# Patient Record
Sex: FEMALE | Race: WHITE | HISPANIC OR LATINO
[De-identification: names, ages, dates, MRNs, and addresses within clinical notes are randomized per-mention and may not be internally consistent; named-entity substitution may affect disease eponyms.]

---

## 2019-06-10 ENCOUNTER — TRANSCRIPTION ENCOUNTER (OUTPATIENT)
Age: 59
End: 2019-06-10

## 2021-03-12 ENCOUNTER — TRANSCRIPTION ENCOUNTER (OUTPATIENT)
Age: 61
End: 2021-03-12

## 2021-05-10 ENCOUNTER — TRANSCRIPTION ENCOUNTER (OUTPATIENT)
Age: 61
End: 2021-05-10

## 2022-07-26 ENCOUNTER — APPOINTMENT (OUTPATIENT)
Dept: PAIN MANAGEMENT | Facility: CLINIC | Age: 62
End: 2022-07-26

## 2022-07-26 PROBLEM — Z00.00 ENCOUNTER FOR PREVENTIVE HEALTH EXAMINATION: Status: ACTIVE | Noted: 2022-07-26

## 2022-08-18 ENCOUNTER — APPOINTMENT (OUTPATIENT)
Dept: PAIN MANAGEMENT | Facility: CLINIC | Age: 62
End: 2022-08-18

## 2022-08-18 VITALS
WEIGHT: 150 LBS | HEIGHT: 64 IN | BODY MASS INDEX: 25.61 KG/M2 | HEART RATE: 70 BPM | SYSTOLIC BLOOD PRESSURE: 145 MMHG | DIASTOLIC BLOOD PRESSURE: 98 MMHG

## 2022-08-18 PROCEDURE — 99213 OFFICE O/P EST LOW 20 MIN: CPT

## 2022-08-18 NOTE — HISTORY OF PRESENT ILLNESS
[FreeTextEntry1] : ORIGINAL PRESENTATION: She continues to have numbness and pain in bilateral hands and wrists, secondary to CRP and cervical spine radiculopathy. She reports no changes in terms of her pain this month. She feels that instead of her hands becoming stiff they are swelling and turning a dark bluish color and she has noted this for several years now. She continues manages medically with gabapentin 100mg BID and 300mg nightly most days with adequate relief, using muscle relaxants sparingly. Once again, she defers injections at this time and has moderate relief.\par \par TODAY: The reason for the visit is ongoing chronic pain noted involving the neck and bilateral arms/hands. Today she rates her pain a 8/10. Overall she denies any changes with regards to her pain. She continues to have intermittent swelling. She manages her chronic pain with the use of gabapentin 100mg BID with 300mg at night and Skelaxin. The regimen allows for pain relief and improvements in activity and range of motion. She wishes to continue with the given regimen at this time. The skelxain make her tired so she takes it sparingly. She requests refill.

## 2022-08-18 NOTE — DISCUSSION/SUMMARY
[de-identified] : PLAN: 62yo F who suffers from chronic pain involving the upper extremities due to RSD as well as fibromyalgia with a component of cervical radiculitis. \par \par The use of medication is to continue as mentioned, I will revisit with her in 6 months for continued care.\par \par Valeria Bunn, MS PA-C\par Sesar Bradley,   \par Diplomate, American Board of Anesthesiology  \par Diplomate, American Board of Pain Medicine\par

## 2022-08-18 NOTE — PHYSICAL EXAM
[de-identified] : Constitutional:  The patient appears well developed, well nourished.  Examination of patients ability to communicate functionally was normal. \par \par Skin:  Skin of the head and face is normal without rashes, lesions or ulcers. \par \par Cardiovascular: Peripheral Vascular System is normal. \par \par Lymphatic: Normal Palpation of lymph nodes in the axilla, inguinal and cervical.

## 2022-09-18 ENCOUNTER — NON-APPOINTMENT (OUTPATIENT)
Age: 62
End: 2022-09-18

## 2023-02-22 ENCOUNTER — APPOINTMENT (OUTPATIENT)
Dept: PAIN MANAGEMENT | Facility: CLINIC | Age: 63
End: 2023-02-22

## 2023-02-22 ENCOUNTER — APPOINTMENT (OUTPATIENT)
Dept: PAIN MANAGEMENT | Facility: CLINIC | Age: 63
End: 2023-02-22
Payer: COMMERCIAL

## 2023-02-22 VITALS — BODY MASS INDEX: 25.61 KG/M2 | HEIGHT: 64 IN | WEIGHT: 150 LBS

## 2023-02-22 PROCEDURE — 99213 OFFICE O/P EST LOW 20 MIN: CPT

## 2023-02-22 NOTE — PHYSICAL EXAM
[de-identified] : Constitutional:  The patient appears well developed, well nourished.  Examination of patients ability to communicate functionally was normal. \par \par Skin:  Skin of the head and face is normal without rashes, lesions or ulcers. \par \par Cardiovascular: Peripheral Vascular System is normal. \par \par Lymphatic: Normal Palpation of lymph nodes in the axilla, inguinal and cervical.

## 2023-02-22 NOTE — DISCUSSION/SUMMARY
[de-identified] : PLAN: 61yo F who suffers from chronic pain involving the neck and upper extremities. The use of medication is to continue as mentioned. Her Gabapentin was refilled today. I will revisit with her in 6 months for continued care.\par \par Elaine Lu PA-C\par Sesar Bradley DO\par \par \par

## 2023-02-22 NOTE — HISTORY OF PRESENT ILLNESS
[FreeTextEntry1] : ORIGINAL PRESENTATION: She is a 62-year-old female who continues to have numbness and pain in bilateral hands and wrists, secondary to CRP and cervical spine radiculopathy. She reports no changes in terms of her pain this month. She feels that instead of her hands becoming stiff they are swelling and turning a dark bluish color and she has noted this for several years now. She continues manages medically with gabapentin 100mg BID and 300mg nightly most days with adequate relief, using muscle relaxants sparingly. Once again, she defers injections at this time and has moderate relief.\par \par TODAY: She presents for a revisit appointment. The reason for the visit is ongoing chronic pain noted involving the neck and bilateral arms/hands. Today, she rates her pain a 7/10. Overall, she denies any changes with regards to her pain over the past 6 months. She continues to have numbness in her hands and intermittent swelling. She uses braces.\par \par She manages her chronic pain with the use of gabapentin 100mg in the AM and afternoon along with 300mg at night. She also takes Skelaxin very sparingly. The regimen allows for pain relief and improvements in activity and range of motion. She wishes to continue with the given regimen at this time.

## 2023-08-18 ENCOUNTER — APPOINTMENT (OUTPATIENT)
Dept: PAIN MANAGEMENT | Facility: CLINIC | Age: 63
End: 2023-08-18
Payer: COMMERCIAL

## 2023-08-18 VITALS
DIASTOLIC BLOOD PRESSURE: 86 MMHG | HEART RATE: 76 BPM | BODY MASS INDEX: 25.61 KG/M2 | SYSTOLIC BLOOD PRESSURE: 135 MMHG | WEIGHT: 150 LBS | HEIGHT: 64 IN

## 2023-08-18 PROCEDURE — 99213 OFFICE O/P EST LOW 20 MIN: CPT

## 2023-08-18 NOTE — PHYSICAL EXAM
[de-identified] : Constitutional:  The patient appears well developed, well nourished.  Examination of patients ability to communicate functionally was normal. \par  \par  Skin:  Skin of the head and face is normal without rashes, lesions or ulcers. \par  \par  Cardiovascular: Peripheral Vascular System is normal. \par  \par  Lymphatic: Normal Palpation of lymph nodes in the axilla, inguinal and cervical. 
Refer to the Assessment tab to view/cancel completed assessment.

## 2023-08-18 NOTE — DISCUSSION/SUMMARY
[de-identified] : 63yo F who suffers from chronic pain involving the neck and upper extremities. The use of medication is to continue as mentioned. Her Gabapentin and Skelaxin were refilled today. I will revisit with her in 6 months for continued care.  MICHAEL Sauceda, DO

## 2023-08-18 NOTE — HISTORY OF PRESENT ILLNESS
[FreeTextEntry1] : ORIGINAL PRESENTATION: She is a 62-year-old female who continues to have numbness and pain in bilateral hands and wrists, secondary to CRP and cervical spine radiculopathy. She reports no changes in terms of her pain this month. She feels that instead of her hands becoming stiff they are swelling and turning a dark bluish color and she has noted this for several years now. She continues manages medically with gabapentin 100mg BID and 300mg nightly most days with adequate relief, using muscle relaxants sparingly. Once again, she defers injections at this time and has moderate relief.  TODAY: She presents for a revisit appointment. The reason for the visit is ongoing chronic pain noted involving the neck and bilateral arms/hands. Her symptoms have remained stable over the past 6 months. Today, she rates her pain a 7.5/10. She continues to have numbness in her hands and intermittent swelling. She uses braces intermittently.  She manages her chronic pain with the use of gabapentin 100mg in the AM and afternoon along with 300mg at night. She also takes Skelaxin very sparingly. The regimen allows for pain relief and improvements in activity and range of motion. She wishes to continue with the given regimen at this time.

## 2024-01-24 ENCOUNTER — APPOINTMENT (OUTPATIENT)
Dept: PAIN MANAGEMENT | Facility: CLINIC | Age: 64
End: 2024-01-24
Payer: COMMERCIAL

## 2024-01-24 VITALS — HEIGHT: 64 IN | WEIGHT: 150 LBS | BODY MASS INDEX: 25.61 KG/M2

## 2024-01-24 PROCEDURE — ZZZZZ: CPT

## 2024-01-24 RX ORDER — AMLODIPINE BESYLATE 5 MG/1
5 TABLET ORAL
Refills: 0 | Status: ACTIVE | COMMUNITY

## 2024-01-24 RX ORDER — LOSARTAN POTASSIUM AND HYDROCHLOROTHIAZIDE 12.5; 1 MG/1; MG/1
100-12.5 TABLET ORAL
Refills: 0 | Status: ACTIVE | COMMUNITY

## 2024-01-24 RX ORDER — METAXALONE 800 MG/1
800 TABLET ORAL
Qty: 30 | Refills: 3 | Status: ACTIVE | COMMUNITY
Start: 2022-08-18 | End: 1900-01-01

## 2024-01-24 NOTE — PHYSICAL EXAM
[de-identified] : Constitutional:  The patient appears well developed, well nourished.  Examination of patients ability to communicate functionally was normal. \par  \par  Skin:  Skin of the head and face is normal without rashes, lesions or ulcers. \par  \par  Cardiovascular: Peripheral Vascular System is normal. \par  \par  Lymphatic: Normal Palpation of lymph nodes in the axilla, inguinal and cervical.

## 2024-01-24 NOTE — HISTORY OF PRESENT ILLNESS
[FreeTextEntry1] : ORIGINAL PRESENTATION: She is a 63-year-old female who continues to have numbness and pain in bilateral hands and wrists, secondary to CRP and cervical spine radiculopathy. She reports no changes in terms of her pain this month. She feels that instead of her hands becoming stiff they are swelling and turning a dark bluish color and she has noted this for several years now. She continues manages medically with gabapentin 100mg BID and 300mg nightly most days with adequate relief, using muscle relaxants sparingly. Once again, she defers injections at this time and has moderate relief.  PATIENT PRESENTS FOR FOLLOW UP: She is under our care for ongoing chronic pain noted involving the neck and bilateral arms/hands. She was last seen in the office back in August 2023. Patient usually follows up every 3 months and is here to have a yearly life insurance form filled out. She continues to have numbness in her hands and intermittent swelling. She uses braces intermittently and notes weakness in the arm and hands.  She manages her chronic pain with the use of gabapentin 100mg in the AM and afternoon along with 300mg at night. She also takes Skelaxin very sparingly. The regimen allows for pain relief and improvements in activity and range of motion. She wishes to continue with the given regimen at this time.

## 2024-01-24 NOTE — ASSESSMENT
[FreeTextEntry1] : This is a 63-year-old female who suffers from chronic pain involving the neck and upper extremities. The use of medication is to continue as mentioned. Her Gabapentin and Skelaxin were refilled today. She will follow up in 3 months for continued care. All this patients questions were answered and the conversation was understood well.  I, Rody Gaytan, attest that this documentation has been prepared under the direction and in the presence of Provider Delmy Franco MD.   Thank you for allowing me to assist in the management of this patient.    Best Regards,    Delmy Franco M.D., FAAPMR   Diplomate, American Board of Physical Medicine and Rehabilitation Diplomate, American Board of Pain Medicine  Diplomate, American Board of Pain Management

## 2024-02-21 ENCOUNTER — APPOINTMENT (OUTPATIENT)
Dept: PAIN MANAGEMENT | Facility: CLINIC | Age: 64
End: 2024-02-21

## 2024-04-24 ENCOUNTER — APPOINTMENT (OUTPATIENT)
Dept: PAIN MANAGEMENT | Facility: CLINIC | Age: 64
End: 2024-04-24
Payer: COMMERCIAL

## 2024-04-24 DIAGNOSIS — M54.12 RADICULOPATHY, CERVICAL REGION: ICD-10-CM

## 2024-04-24 DIAGNOSIS — M79.7 FIBROMYALGIA: ICD-10-CM

## 2024-04-24 DIAGNOSIS — M54.2 CERVICALGIA: ICD-10-CM

## 2024-04-24 DIAGNOSIS — G90.519 COMPLEX REGIONAL PAIN SYNDROME I OF UNSPECIFIED UPPER LIMB: ICD-10-CM

## 2024-04-24 PROCEDURE — 99214 OFFICE O/P EST MOD 30 MIN: CPT

## 2024-04-24 RX ORDER — GABAPENTIN 100 MG/1
100 CAPSULE ORAL
Qty: 60 | Refills: 3 | Status: ACTIVE | COMMUNITY
Start: 2022-08-18 | End: 1900-01-01

## 2024-04-24 RX ORDER — GABAPENTIN 300 MG/1
300 CAPSULE ORAL
Qty: 30 | Refills: 3 | Status: ACTIVE | COMMUNITY
Start: 2022-08-18 | End: 1900-01-01

## 2024-04-24 NOTE — ASSESSMENT
[FreeTextEntry1] : This is a 63-year-old female who suffers from chronic pain involving the neck and upper extremities. The use of medication is to continue as mentioned. Her Gabapentin was refilled today. She will follow up in 3 months for continued care. All this patient's questions were answered and the conversation was understood well.  MICHAEL Sauceda MD

## 2024-04-24 NOTE — HISTORY OF PRESENT ILLNESS
[FreeTextEntry1] : ORIGINAL PRESENTATION: She is a 63-year-old female who continues to have numbness and pain in bilateral hands and wrists, secondary to CRP and cervical spine radiculopathy. She reports no changes in terms of her pain this month. She feels that instead of her hands becoming stiff they are swelling and turning a dark bluish color and she has noted this for several years now. She continues manages medically with gabapentin 100mg BID and 300mg nightly most days with adequate relief, using muscle relaxants sparingly. Once again, she defers injections at this time and has moderate relief.  PATIENT PRESENTS FOR FOLLOW UP: Patient presents for a routine visit. She is under our care for ongoing chronic pain noted involving the neck and bilateral arms/hands. She continues to have numbness in her hands and intermittent swelling. She uses braces intermittently and notes weakness in the arm and hands. She manages her chronic pain with the use of gabapentin 100mg in the AM and afternoon along with 300mg at night. She also takes Skelaxin very sparingly. The regimen allows for pain relief and improvements in activity and range of motion. She wishes to continue with the given regimen at this time.

## 2024-04-24 NOTE — PHYSICAL EXAM
[de-identified] : Constitutional:  The patient appears well developed, well nourished.  Examination of patients ability to communicate functionally was normal. \par  \par  Skin:  Skin of the head and face is normal without rashes, lesions or ulcers. \par  \par  Cardiovascular: Peripheral Vascular System is normal. \par  \par  Lymphatic: Normal Palpation of lymph nodes in the axilla, inguinal and cervical.

## 2024-07-24 ENCOUNTER — APPOINTMENT (OUTPATIENT)
Dept: PAIN MANAGEMENT | Facility: CLINIC | Age: 64
End: 2024-07-24

## 2024-07-24 DIAGNOSIS — M79.7 FIBROMYALGIA: ICD-10-CM

## 2024-07-24 DIAGNOSIS — M54.12 RADICULOPATHY, CERVICAL REGION: ICD-10-CM

## 2024-07-24 DIAGNOSIS — M54.2 CERVICALGIA: ICD-10-CM

## 2024-07-24 DIAGNOSIS — G90.519 COMPLEX REGIONAL PAIN SYNDROME I OF UNSPECIFIED UPPER LIMB: ICD-10-CM

## 2024-07-24 PROCEDURE — 99214 OFFICE O/P EST MOD 30 MIN: CPT

## 2024-07-24 NOTE — HISTORY OF PRESENT ILLNESS
[FreeTextEntry1] : ORIGINAL PRESENTATION: She is a 63-year-old female who continues to have numbness and pain in bilateral hands and wrists, secondary to CRP and cervical spine radiculopathy. She reports no changes in terms of her pain this month. She feels that instead of her hands becoming stiff they are swelling and turning a dark bluish color and she has noted this for several years now. She continues manages medically with gabapentin 100mg BID and 300mg nightly most days with adequate relief, using muscle relaxants sparingly. Once again, she defers injections at this time and has moderate relief.  PATIENT PRESENTS FOR FOLLOW UP: Patient presents for a routine visit. She is under our care for ongoing chronic pain noted involving the neck and bilateral arms/hands. She continues to have numbness in her hands and intermittent swelling. She uses braces intermittently and notes weakness in the arm and hands. She has not wanted to undergo PT or injections. She manages her chronic pain with the use of gabapentin 100mg in the AM and afternoon along with 300mg at night. She also takes Skelaxin very sparingly. The regimen allows for pain relief and improvements in activity and range of motion. She wishes to continue with the given regimen at this time.

## 2024-07-24 NOTE — ASSESSMENT
[FreeTextEntry1] : This is a 63-year-old female who suffers from chronic pain involving the neck and upper extremities. The use of medication is to continue as mentioned. Her Gabapentin and Skelaxin were refilled today. She will follow up in 3 months for continued care. All this patient's questions were answered and the conversation was understood well.  MICHAEL Sauceda MD

## 2024-07-24 NOTE — PHYSICAL EXAM
[de-identified] : Constitutional:  The patient appears well developed, well nourished.  Examination of patients ability to communicate functionally was normal. \par  \par  Skin:  Skin of the head and face is normal without rashes, lesions or ulcers. \par  \par  Cardiovascular: Peripheral Vascular System is normal. \par  \par  Lymphatic: Normal Palpation of lymph nodes in the axilla, inguinal and cervical.

## 2024-10-24 ENCOUNTER — APPOINTMENT (OUTPATIENT)
Dept: PAIN MANAGEMENT | Facility: CLINIC | Age: 64
End: 2024-10-24

## 2024-10-24 DIAGNOSIS — G90.519 COMPLEX REGIONAL PAIN SYNDROME I OF UNSPECIFIED UPPER LIMB: ICD-10-CM

## 2024-10-24 DIAGNOSIS — M54.2 CERVICALGIA: ICD-10-CM

## 2024-10-24 DIAGNOSIS — M54.12 RADICULOPATHY, CERVICAL REGION: ICD-10-CM

## 2024-10-24 PROCEDURE — 99204 OFFICE O/P NEW MOD 45 MIN: CPT

## 2024-10-24 PROCEDURE — 99213 OFFICE O/P EST LOW 20 MIN: CPT

## 2025-02-20 ENCOUNTER — APPOINTMENT (OUTPATIENT)
Dept: PAIN MANAGEMENT | Facility: CLINIC | Age: 65
End: 2025-02-20
Payer: COMMERCIAL

## 2025-02-20 DIAGNOSIS — M54.2 CERVICALGIA: ICD-10-CM

## 2025-02-20 DIAGNOSIS — G90.519 COMPLEX REGIONAL PAIN SYNDROME I OF UNSPECIFIED UPPER LIMB: ICD-10-CM

## 2025-02-20 DIAGNOSIS — M54.12 RADICULOPATHY, CERVICAL REGION: ICD-10-CM

## 2025-02-20 PROCEDURE — 99214 OFFICE O/P EST MOD 30 MIN: CPT

## 2025-06-19 ENCOUNTER — APPOINTMENT (OUTPATIENT)
Dept: PAIN MANAGEMENT | Facility: CLINIC | Age: 65
End: 2025-06-19

## 2025-06-19 PROCEDURE — 99214 OFFICE O/P EST MOD 30 MIN: CPT
